# Patient Record
Sex: FEMALE | Race: WHITE | Employment: UNEMPLOYED | URBAN - METROPOLITAN AREA
[De-identification: names, ages, dates, MRNs, and addresses within clinical notes are randomized per-mention and may not be internally consistent; named-entity substitution may affect disease eponyms.]

---

## 2017-07-17 ENCOUNTER — HOSPITAL ENCOUNTER (OUTPATIENT)
Dept: OTHER | Age: 62
Discharge: HOME | End: 2017-07-31
Attending: INTERNAL MEDICINE | Admitting: INTERNAL MEDICINE

## 2019-07-30 ENCOUNTER — HOSPITAL ENCOUNTER (OUTPATIENT)
Dept: WOUND CARE | Age: 64
Discharge: HOME | End: 2019-07-30
Payer: MEDICARE

## 2019-07-30 VITALS
TEMPERATURE: 97.4 F | RESPIRATION RATE: 18 BRPM | HEIGHT: 64 IN | BODY MASS INDEX: 31.5 KG/M2 | SYSTOLIC BLOOD PRESSURE: 113 MMHG | WEIGHT: 184.53 LBS | HEART RATE: 86 BPM | DIASTOLIC BLOOD PRESSURE: 77 MMHG

## 2019-07-30 DIAGNOSIS — L24.0 DERMATITIS DUE TO DETERGENTS: ICD-10-CM

## 2019-07-30 PROCEDURE — 99202 OFFICE O/P NEW SF 15 MIN: CPT | Performed by: NURSE PRACTITIONER

## 2019-07-30 PROCEDURE — 99213 OFFICE O/P EST LOW 20 MIN: CPT

## 2019-07-30 RX ORDER — ATORVASTATIN CALCIUM 40 MG/1
40 TABLET, FILM COATED ORAL DAILY
COMMUNITY

## 2019-07-30 SDOH — HEALTH STABILITY: MENTAL HEALTH: HOW OFTEN DO YOU HAVE A DRINK CONTAINING ALCOHOL?: NEVER

## 2019-07-30 ASSESSMENT — PAIN SCALES - GENERAL
PAINLEVEL_OUTOF10: 0
PAINLEVEL_OUTOF10: 0

## 2019-07-31 PROBLEM — L24.0 DERMATITIS DUE TO DETERGENTS: Status: ACTIVE | Noted: 2019-07-31

## 2019-07-31 NOTE — PROGRESS NOTES
washing dishes even if detergent is now one she is used to. Advised best application of Eucerin at least 2x/day and to wear cotton white gloves at night. Advised to refrain from pulling off peeling skin. Pt/family agreeable to plan of care and questions answered. Treatment Note please see attached Discharge Instructions    Written patient dismissal instructions given to patient and signed by patient or POA. Discharge Instructions       [address] and [facility] Therapy   Physician Orders and Discharge Instructions  [address] [facility]  [address] [facility], [facility]  Telephone: [phone]    NAME:  Kervin Shook  YOB: 1955  MEDICAL RECORD NUMBER:  [id number]  DATE:  7/30/2019    Wound Cleansing:   Do not scrub or use excessive force. Cleanse wound prior to applying a clean dressing with:  [x] Normal Saline [x] Keep Wound Dry in Shower           Topical Treatments:  Do not apply lotions, creams, or ointments to wound bed unless directed. [] Apply moisturizing lotion to skin surrounding the wound prior to dressing change.  [] Apply antifungal ointment to skin surrounding the wound prior to dressing change.  [] Apply thin film of moisture barrier ointment to skin immediately around wound.   [x] Other:  **EUCERIN  CREAM TO BOTH HANDS 3-4 TIMES DAILY    ** NO DISHWASHING UNTIL HANDS ARE HEALED**       Dressings:           Wound Location  BILATERAL HANDS      [] Apply Primary Dressing:       [] MediHoney Gel [] Alginate with Silver [] Alginate   [] Collagen [] Collagen with Silver   [] Santyl with Moisten saline gauze     [] Hydrocolloid   [] MediHoney Alginate [] Foam with Silver   [] Foam   [] Hydrofera Blue    [] Mepilex Border    [] Moisten with Saline [] Hydrogel [] Mepitel     [] Bactroban/Mupirocin [] Polysporin  [] Other:      [] Cover and Secure with:     [] Gauze [] Tanner [] CNP                       Electronically signed by RANDA Angela CNP on 7/31/2019 at 3:16 PM

## 2019-08-12 ENCOUNTER — HOSPITAL ENCOUNTER (OUTPATIENT)
Dept: WOUND CARE | Age: 64
Discharge: HOME | End: 2019-08-12
Payer: MEDICARE

## 2019-08-12 VITALS
SYSTOLIC BLOOD PRESSURE: 131 MMHG | HEART RATE: 101 BPM | TEMPERATURE: 97.7 F | DIASTOLIC BLOOD PRESSURE: 89 MMHG | RESPIRATION RATE: 20 BRPM

## 2019-08-12 DIAGNOSIS — L24.0 DERMATITIS DUE TO DETERGENTS: Primary | ICD-10-CM

## 2019-08-12 DIAGNOSIS — S61.401D OPEN WOUND OF RIGHT HAND WITHOUT FOREIGN BODY, UNSPECIFIED WOUND TYPE, SUBSEQUENT ENCOUNTER: ICD-10-CM

## 2019-08-12 PROCEDURE — 11042 DBRDMT SUBQ TIS 1ST 20SQCM/<: CPT

## 2019-08-12 PROCEDURE — 11042 DBRDMT SUBQ TIS 1ST 20SQCM/<: CPT | Performed by: NURSE PRACTITIONER

## 2019-08-12 RX ORDER — CLOBETASOL PROPIONATE 0.5 MG/G
OINTMENT TOPICAL ONCE
Status: CANCELLED | OUTPATIENT
Start: 2019-08-12

## 2019-08-12 RX ORDER — GENTAMICIN SULFATE 1 MG/G
OINTMENT TOPICAL ONCE
Status: CANCELLED | OUTPATIENT
Start: 2019-08-12

## 2019-08-12 RX ORDER — BACITRACIN, NEOMYCIN, POLYMYXIN B 400; 3.5; 5 [USP'U]/G; MG/G; [USP'U]/G
OINTMENT TOPICAL ONCE
Status: CANCELLED | OUTPATIENT
Start: 2019-08-12

## 2019-08-12 RX ORDER — BETAMETHASONE DIPROPIONATE 0.05 %
OINTMENT (GRAM) TOPICAL ONCE
Status: CANCELLED | OUTPATIENT
Start: 2019-08-12

## 2019-08-12 RX ORDER — GINSENG 100 MG
CAPSULE ORAL ONCE
Status: CANCELLED | OUTPATIENT
Start: 2019-08-12

## 2019-08-12 RX ORDER — BACITRACIN ZINC AND POLYMYXIN B SULFATE 500; 1000 [USP'U]/G; [USP'U]/G
OINTMENT TOPICAL ONCE
Status: CANCELLED | OUTPATIENT
Start: 2019-08-12

## 2019-08-12 ASSESSMENT — PAIN DESCRIPTION - LOCATION
LOCATION: HAND
LOCATION: HAND

## 2019-08-12 ASSESSMENT — PAIN SCALES - GENERAL
PAINLEVEL_OUTOF10: 9
PAINLEVEL_OUTOF10: 10

## 2019-08-12 ASSESSMENT — PAIN DESCRIPTION - ONSET
ONSET: ON-GOING
ONSET: ON-GOING

## 2019-08-12 ASSESSMENT — PAIN DESCRIPTION - PAIN TYPE
TYPE: CHRONIC PAIN
TYPE: CHRONIC PAIN

## 2019-08-12 ASSESSMENT — PAIN - FUNCTIONAL ASSESSMENT
PAIN_FUNCTIONAL_ASSESSMENT: PREVENTS OR INTERFERES SOME ACTIVE ACTIVITIES AND ADLS
PAIN_FUNCTIONAL_ASSESSMENT: PREVENTS OR INTERFERES SOME ACTIVE ACTIVITIES AND ADLS

## 2019-08-12 ASSESSMENT — PAIN DESCRIPTION - ORIENTATION
ORIENTATION: RIGHT
ORIENTATION: RIGHT

## 2019-08-12 ASSESSMENT — PAIN DESCRIPTION - PROGRESSION
CLINICAL_PROGRESSION: NOT CHANGED
CLINICAL_PROGRESSION: NOT CHANGED

## 2019-08-12 ASSESSMENT — PAIN DESCRIPTION - FREQUENCY
FREQUENCY: INTERMITTENT
FREQUENCY: INTERMITTENT

## 2019-08-12 ASSESSMENT — PAIN DESCRIPTION - DESCRIPTORS
DESCRIPTORS: STABBING
DESCRIPTORS: STABBING

## 2019-08-13 PROBLEM — S61.401A OPEN WOUND OF RIGHT HAND WITHOUT FOREIGN BODY: Status: ACTIVE | Noted: 2019-08-13

## 2019-08-13 NOTE — PROGRESS NOTES
Undefined edges 8/12/2019 10:22 AM   Kimberlyn-wound Assessment Dry;Pink 8/12/2019 10:22 AM   Non-staged Wound Description Partial thickness 8/12/2019 10:22 AM   [place]%Wound Bed 50 8/12/2019 10:22 AM   Yellow%Wound Bed 50 8/12/2019 10:22 AM   Number of days: 14       Wound 07/30/19 Hand Left Wound #2; noted approx 6/25/19 (Active)   Wound Image   8/12/2019 10:22 AM   Wound Other 7/30/2019 12:09 PM   Dressing Status Other (Comment) 7/30/2019 11:43 AM   Wound Length (cm) 0 cm 8/12/2019 10:22 AM   Wound Width (cm) 0 cm 8/12/2019 10:22 AM   Wound Depth (cm) 0 cm 8/12/2019 10:22 AM   Wound Surface Area (cm^2) 0 cm^2 8/12/2019 10:22 AM   Change in Wound Size % (l*w) 100 8/12/2019 10:22 AM   Wound Volume (cm^3) 0 cm^3 8/12/2019 10:22 AM   Wound Healing % 100 8/12/2019 10:22 AM   Post-Procedure Length (cm) 0 cm 8/12/2019 11:00 AM   Post-Procedure Width (cm) 0 cm 8/12/2019 11:00 AM   Post-Procedure Depth (cm) 0 cm 8/12/2019 11:00 AM   Post-Procedure Surface Area (cm^2) 0 cm^2 8/12/2019 11:00 AM   Post-Procedure Volume (cm^3) 0 cm^3 8/12/2019 11:00 AM   Wound Assessment Epithelialization 8/12/2019 11:00 AM   Drainage Amount None 7/30/2019 11:43 AM   Odor None 7/30/2019 11:43 AM   Margins Undefined edges 7/30/2019 11:43 AM   Kimberlyn-wound Assessment Fragile;Pink 7/30/2019 11:43 AM   Non-staged Wound Description Partial thickness 7/30/2019 11:43 AM   [place]%Wound Bed 80 7/30/2019 11:43 AM   Yellow%Wound Bed 20 7/30/2019 11:43 AM   Number of days: 14       Wound 08/12/19 Leg Anterior; Lower;Right #3 ( first noted 8/12/2019 ) (Active)   Wound Image   8/12/2019 11:14 AM   Wound Traumatic 8/12/2019 11:14 AM   Dressing Changed Changed/New 8/12/2019 11:24 AM   Wound Length (cm) 0.8 cm 8/12/2019 11:14 AM   Wound Width (cm) 0.5 cm 8/12/2019 11:14 AM   Wound Depth (cm) 0.2 cm 8/12/2019 11:14 AM   Wound Surface Area (cm^2) 0.4 cm^2 8/12/2019 11:14 AM   Wound Volume (cm^3) 0.08 cm^3 8/12/2019 11:14 AM   Post-Procedure Length (cm) 0.8 cm 8/12/2019

## 2019-08-14 RX ORDER — LIDOCAINE HYDROCHLORIDE 40 MG/ML
SOLUTION TOPICAL ONCE
Status: DISCONTINUED | OUTPATIENT
Start: 2019-08-14 | End: 2019-08-19

## 2019-08-19 ENCOUNTER — HOSPITAL ENCOUNTER (OUTPATIENT)
Dept: WOUND CARE | Age: 64
Discharge: HOME | End: 2019-08-19
Payer: MEDICARE

## 2019-08-19 VITALS
DIASTOLIC BLOOD PRESSURE: 85 MMHG | SYSTOLIC BLOOD PRESSURE: 126 MMHG | RESPIRATION RATE: 18 BRPM | TEMPERATURE: 98.5 F | HEART RATE: 101 BPM

## 2019-08-19 DIAGNOSIS — L24.0 DERMATITIS DUE TO DETERGENTS: ICD-10-CM

## 2019-08-19 DIAGNOSIS — S61.401D OPEN WOUND OF RIGHT HAND WITHOUT FOREIGN BODY, UNSPECIFIED WOUND TYPE, SUBSEQUENT ENCOUNTER: Primary | ICD-10-CM

## 2019-08-19 PROCEDURE — 11042 DBRDMT SUBQ TIS 1ST 20SQCM/<: CPT | Performed by: NURSE PRACTITIONER

## 2019-08-19 PROCEDURE — 11042 DBRDMT SUBQ TIS 1ST 20SQCM/<: CPT

## 2019-08-19 RX ORDER — LIDOCAINE HYDROCHLORIDE 40 MG/ML
SOLUTION TOPICAL ONCE
Status: DISCONTINUED | OUTPATIENT
Start: 2019-08-19 | End: 2019-08-20 | Stop reason: HOSPADM

## 2019-08-19 ASSESSMENT — PAIN SCALES - GENERAL: PAINLEVEL_OUTOF10: 0

## 2019-08-20 NOTE — PROGRESS NOTES
dressing change.  [] Apply antifungal ointment to skin surrounding the wound prior to dressing change.  [] Apply thin film of moisture barrier ointment to skin immediately around wound. [] Other:            Dressings:                  Wound Location  : RIGHT LATERAL ANKLE AND RIGHT ANTERIOR LOWER LEG     [x] Apply Primary Dressing:                                              [x] Other:  Mupirocin Ointment     [x] Cover and Secure with:                       [] Gauze [] Tanner   [] Kerlix              [] Ace Wrap       [] Cover Roll Tape         [] ABD                              [x] Other: Bandaid                 Avoid contact of tape with skin.     [x] Change dressing:       [x] Daily               [] Every Other Day        [] Three times per week              [] Once a week  [] Do Not Change Dressing         [] Other:      Dietary:  [x] Diet as tolerated:        [] Calorie Diabetic Diet: [] No Added Salt:  [] Increase Protein:        [] Other:      Activity:  [x] Activity as tolerated:  [] Patient has no activity restrictions     [] Strict Bedrest:            [] Remain off Work:     [] May return to full duty work: [phone] to work with [address]     If you are still having pain after you go home:  [x] Elevate the affected limb.        [x] Use over-the-counter medications you would normally use for pain as permitted by your family doctor.   [x] For persistent pain not relieved by the above interventions, please call your family doctor.             Return Appointment:  [] Wound and dressing supply provider:   [] ECF or Home Healthcare:   [] Wound Natan Jolly or NP scheduled for Wound Assessment:   [x] Return Appointment: Lacie Escobar CNP IN 2 WEEK  (September 3rd)  [] Ordered tests:      Nurse Case Manger: [facility]  Electronically signed by Jael Valle RN on 8/19/2019 at 10:39 AM         [address] [facility], [place] [address] Information: Should you experience any

## 2019-09-03 ENCOUNTER — HOSPITAL ENCOUNTER (OUTPATIENT)
Dept: WOUND CARE | Age: 64
Discharge: HOME | End: 2019-09-03
Payer: MEDICARE

## 2019-09-03 ENCOUNTER — TELEPHONE (OUTPATIENT)
Dept: WOUND CARE | Age: 64
End: 2019-09-03

## 2019-09-05 NOTE — DISCHARGE INSTR - COC
(cm) 0 cm 8/19/2019 10:13 AM   Wound Width (cm) 0 cm 8/19/2019 10:13 AM   Wound Depth (cm) 0 cm 8/19/2019 10:13 AM   Wound Surface Area (cm^2) 0 cm^2 8/19/2019 10:13 AM   Change in Wound Size % (l*w) 100 8/19/2019 10:13 AM   Wound Volume (cm^3) 0 cm^3 8/19/2019 10:13 AM   Wound Healing % 100 8/19/2019 10:13 AM   Post-Procedure Length (cm) 0 cm 8/19/2019 10:32 AM   Post-Procedure Width (cm) 0 cm 8/19/2019 10:32 AM   Post-Procedure Depth (cm) 0 cm 8/19/2019 10:32 AM   Post-Procedure Surface Area (cm^2) 0 cm^2 8/19/2019 10:32 AM   Post-Procedure Volume (cm^3) 0 cm^3 8/19/2019 10:32 AM   Wound Assessment Epithelialization 8/19/2019 10:13 AM   Margins Undefined edges 8/12/2019 10:22 AM   Kimberlyn-wound Assessment Dry;Pink 8/12/2019 10:22 AM   Non-staged Wound Description Partial thickness 8/12/2019 10:22 AM   [place]%Wound Bed 50 8/12/2019 10:22 AM   Yellow%Wound Bed 50 8/12/2019 10:22 AM   Number of days: 37       Wound 08/12/19 Leg Anterior; Lower;Right #3 ( first noted 8/12/2019 ) (Active)   Wound Image   8/12/2019 11:14 AM   Wound Traumatic 8/12/2019 11:14 AM   Dressing Status Other (Comment) 8/19/2019 10:13 AM   Dressing Changed Changed/New 8/19/2019 10:55 AM   Dressing/Treatment Antibacterial Ointment; Adhesive bandage 8/19/2019 10:55 AM   Wound Length (cm) 0.8 cm 8/19/2019 10:13 AM   Wound Width (cm) 0.5 cm 8/19/2019 10:13 AM   Wound Depth (cm) 0.2 cm 8/19/2019 10:13 AM   Wound Surface Area (cm^2) 0.4 cm^2 8/19/2019 10:13 AM   Change in Wound Size % (l*w) 0 8/19/2019 10:13 AM   Wound Volume (cm^3) 0.08 cm^3 8/19/2019 10:13 AM   Wound Healing % 0 8/19/2019 10:13 AM   Post-Procedure Length (cm) 0.9 cm 8/19/2019 10:32 AM   Post-Procedure Width (cm) 0.6 cm 8/19/2019 10:32 AM   Post-Procedure Depth (cm) 0.2 cm 8/19/2019 10:32 AM   Post-Procedure Surface Area (cm^2) 0.54 cm^2 8/19/2019 10:32 AM   Post-Procedure Volume (cm^3) 0.11 cm^3 8/19/2019 10:32 AM   Wound Assessment Granulation tissue;Slough 8/19/2019 10:13 AM   Drainage {Admit to Appropriate Level of Care:78095} for {GREATER/LESS:330642771} 30 days.      Update Admission H&P: {CHP DME Changes in CQLTW:781767615}    PHYSICIAN SIGNATURE:  {Esignature:907877686}

## 2019-09-09 ENCOUNTER — HOSPITAL ENCOUNTER (OUTPATIENT)
Dept: WOUND CARE | Age: 64
Discharge: HOME | End: 2019-09-09
Payer: MEDICARE

## 2019-09-09 VITALS
HEART RATE: 121 BPM | BODY MASS INDEX: 29.29 KG/M2 | WEIGHT: 170.64 LBS | TEMPERATURE: 97.8 F | DIASTOLIC BLOOD PRESSURE: 85 MMHG | SYSTOLIC BLOOD PRESSURE: 121 MMHG | RESPIRATION RATE: 20 BRPM

## 2019-09-09 DIAGNOSIS — L97.919 VENOUS STASIS ULCER OF RIGHT LOWER EXTREMITY (HCC): ICD-10-CM

## 2019-09-09 DIAGNOSIS — I83.019 VENOUS STASIS ULCER OF RIGHT LOWER EXTREMITY (HCC): ICD-10-CM

## 2019-09-09 DIAGNOSIS — I87.2 VENOUS INSUFFICIENCY OF BOTH LOWER EXTREMITIES: ICD-10-CM

## 2019-09-09 PROCEDURE — 11042 DBRDMT SUBQ TIS 1ST 20SQCM/<: CPT

## 2019-09-09 PROCEDURE — 11042 DBRDMT SUBQ TIS 1ST 20SQCM/<: CPT | Performed by: NURSE PRACTITIONER

## 2019-09-09 RX ORDER — LIDOCAINE HYDROCHLORIDE 40 MG/ML
SOLUTION TOPICAL ONCE
Status: DISCONTINUED | OUTPATIENT
Start: 2019-09-09 | End: 2019-09-10 | Stop reason: HOSPADM

## 2019-09-09 ASSESSMENT — PAIN SCALES - GENERAL
PAINLEVEL_OUTOF10: 0
PAINLEVEL_OUTOF10: 0

## 2019-09-11 PROBLEM — L97.919 VENOUS STASIS ULCER OF RIGHT LOWER EXTREMITY (HCC): Status: ACTIVE | Noted: 2019-09-11

## 2019-09-11 PROBLEM — I83.019 VENOUS STASIS ULCER OF RIGHT LOWER EXTREMITY (HCC): Status: ACTIVE | Noted: 2019-09-11

## 2019-09-11 PROBLEM — I87.2 VENOUS INSUFFICIENCY OF BOTH LOWER EXTREMITIES: Status: ACTIVE | Noted: 2019-09-11

## 2019-09-11 NOTE — PROGRESS NOTES
[facility]   Progress Note and Procedure Note      Jessica Young  MEDICAL RECORD NUMBER:  [id number]  AGE: 62 y.o. GENDER: female  : 1955  EPISODE DATE:  2019    Subjective:     Chief Complaint   Patient presents with    Wound Check     follow up right lower leg and feet wounds         HISTORY of PRESENT ILLNESS HPI  Meagan Simpson is a 62 y. o. female who presents today for wound/ulcer evaluation. Still with partial thickness wounds right lower leg and right lateral malleolus. Leg wounds come from trauma \"banging into things\". Pt is brought in by a friend who is familiar with pt's history. Denies other constitutional issues. When asked if pt was on any psychiatric or antidepressant medications her friend said no.    History of Wound Context: allergic skin exposure and traumatic wounds lower legs  Wound/Ulcer Pain Timing/Severity: intermittenent  Quality of pain: N/A  Severity:  3 / 10   Modifying Factors: None  Associated Signs/Symptoms:      Ulcer Identification:  Ulcer Type: partial thickness wounds, venous stasis     Contributing Factors: pt has multiple new and healing open areas from \"bumping\" into things. She sits \"cross legged\" \"most of the time\" rubbing right lateral malleolus.     Wound: N/A      PAST MEDICAL HISTORY        Diagnosis Date    Arthritis     hip and shoulder    Asthma     Hiatal hernia     Hyperlipidemia     Hypothyroid     UTI (urinary tract infection)        PAST SURGICAL HISTORY    Past Surgical History:   Procedure Laterality Date    HERNIA REPAIR         FAMILY HISTORY    History reviewed. No pertinent family history.     SOCIAL HISTORY    Social History     Tobacco Use    Smoking status: Former Smoker     Last attempt to quit: 2018     Years since quittin.7    Smokeless tobacco: Never Used   Substance Use Topics    Alcohol use: Not Currently     Frequency: Never     Comment: Patient Quit Drinking 33 years ago    Drug use: Not on file       ALLERGIES    Allergies   Allergen Reactions    Tramadol Anaphylaxis       MEDICATIONS    Current Outpatient Medications on File Prior to Encounter   Medication Sig Dispense Refill    NONFORMULARY Indications: Minerin cream to hands       Acetaminophen (TYLENOL PO) Take 500 mg by mouth      aspirin 81 MG tablet Take 81 mg by mouth daily      Levothyroxine Sodium (SYNTHROID PO) Take by mouth      Fluticasone-Salmeterol (ADVAIR DISKUS IN) Inhale into the lungs 2 times daily      atorvastatin (LIPITOR) 40 MG tablet Take 40 mg by mouth daily       No current facility-administered medications on file prior to encounter. REVIEW OF SYSTEMS    Pertinent items are noted in HPI. Objective:      /85   Pulse 121   Temp 97.8 °F (36.6 °C) (Oral)   Resp 20   Wt 170 lb 10.2 oz (77.4 kg)   BMI 29.29 kg/m²     Wt Readings from Last 3 Encounters:   09/09/19 170 lb 10.2 oz (77.4 kg)   07/30/19 184 lb 8.4 oz (83.7 kg)       PHYSICAL EXAM    General Appearance: alert and oriented to person, place and time, well-developed and well-nourished, in no acute distress, disheveled and pale  Skin: warm and dry  Head: normocephalic and atraumatic  Eyes: pupils equal, round, and reactive to light  Pulmonary/Chest:  normal air movement, no respiratory distress  Cardiovascular: normal rate, regular rhythm and intact distal pulses  Extremities: no cyanosis and no clubbing  Wounds:  Improvement in measurements and no overt signs of infection    Assessment:        Problem List Items Addressed This Visit     Venous insufficiency of both lower extremities    Venous stasis ulcer of right lower extremity ([address]           Procedure Note  Indications:  Based on my examination of this patient's wound(s)/ulcer(s) today, debridement is required to promote healing and evaluate the wound base.     Performed by: RANDA Mcdermott - JACK    Consent obtained:  Yes    Time out taken:  Yes    Pain Control: Anesthetic  Anesthetic: 4% noted 8/12/2019 ) (Active)   Wound Image   9/9/2019  1:42 PM   Wound Traumatic 8/12/2019 11:14 AM   Dressing Status Other (Comment) 8/19/2019 10:13 AM   Dressing Changed Changed/New 9/9/2019  2:44 PM   Dressing/Treatment Adhesive bandage; Antibacterial Ointment 9/9/2019  2:44 PM   Wound Length (cm) 0.4 cm 9/9/2019  1:42 PM   Wound Width (cm) 0.3 cm 9/9/2019  1:42 PM   Wound Depth (cm) 0.1 cm 9/9/2019  1:42 PM   Wound Surface Area (cm^2) 0.12 cm^2 9/9/2019  1:42 PM   Change in Wound Size % (l*w) 40 9/9/2019  1:42 PM   Wound Volume (cm^3) 0.01 cm^3 9/9/2019  1:42 PM   Wound Healing % 75 9/9/2019  1:42 PM   Post-Procedure Length (cm) 0.4 cm 9/9/2019  2:44 PM   Post-Procedure Width (cm) 0.3 cm 9/9/2019  2:44 PM   Post-Procedure Depth (cm) 0.1 cm 9/9/2019  2:44 PM   Post-Procedure Surface Area (cm^2) 0.12 cm^2 9/9/2019  2:44 PM   Post-Procedure Volume (cm^3) 0.01 cm^3 9/9/2019  2:44 PM   Wound Assessment Granulation tissue;Slough 9/9/2019  1:42 PM   Drainage Amount Other (Comment) 9/9/2019  1:42 PM   Odor None 9/9/2019  1:42 PM   Margins Attached edges 9/9/2019  1:42 PM   Kimberlyn-wound Assessment White 9/9/2019  1:42 PM   Non-staged Wound Description Full thickness 9/9/2019  1:42 PM   Red%Wound Bed 80 8/19/2019 10:13 AM   Yellow%Wound Bed 100 9/9/2019  1:42 PM   Number of days: 29       Wound 09/09/19 Foot Right;Medial # 1 right medial foot onset 5 days ago (Active)   Wound Image   9/9/2019  1:42 PM   Dressing Changed Changed/New 9/9/2019  2:44 PM   Dressing/Treatment Antibacterial Ointment; Adhesive bandage 9/9/2019  2:44 PM   Wound Length (cm) 0.4 cm 9/9/2019  1:42 PM   Wound Width (cm) 0.4 cm 9/9/2019  1:42 PM   Wound Depth (cm) 0.1 cm 9/9/2019  1:42 PM   Wound Surface Area (cm^2) 0.16 cm^2 9/9/2019  1:42 PM   Wound Volume (cm^3) 0.02 cm^3 9/9/2019  1:42 PM   Post-Procedure Length (cm) 0.4 cm 9/9/2019  2:44 PM   Post-Procedure Width (cm) 0.4 cm 9/9/2019  2:44 PM   Post-Procedure Depth (cm) 0.1 cm 9/9/2019  2:44 PM your wound care, please contact the [address] [place] [address] [phone] [address] 8:00 am - 4:30 pm and Friday 8:00 am - 12:30 pm.  If you need help with your wound outside these hours and cannot wait until we are again available, contact your PCP or go to the hospital emergency room.      PLEASE NOTE: IF YOU ARE UNABLE TO OBTAIN WOUND SUPPLIES, CONTINUE TO USE THE SUPPLIES YOU HAVE AVAILABLE UNTIL YOU ARE ABLE TO [facility].  IT IS MOST IMPORTANT TO KEEP THE WOUND COVERED AT ALL TIMES.     Physician Signature:_______________________     Date: ___________ Time:  ____________        Nish Moran CNP           Electronically signed by RANDA Boothe CNP on 9/11/2019 at 10:27 AM

## 2019-09-23 ENCOUNTER — HOSPITAL ENCOUNTER (OUTPATIENT)
Dept: WOUND CARE | Age: 64
Discharge: HOME | End: 2019-09-23
Payer: MEDICARE

## 2019-09-23 VITALS
RESPIRATION RATE: 20 BRPM | HEART RATE: 97 BPM | SYSTOLIC BLOOD PRESSURE: 114 MMHG | DIASTOLIC BLOOD PRESSURE: 77 MMHG | TEMPERATURE: 97.2 F

## 2019-09-23 DIAGNOSIS — I87.2 VENOUS INSUFFICIENCY OF BOTH LOWER EXTREMITIES: ICD-10-CM

## 2019-09-23 DIAGNOSIS — L24.0 DERMATITIS DUE TO DETERGENTS: ICD-10-CM

## 2019-09-23 DIAGNOSIS — L97.919 VENOUS STASIS ULCER OF RIGHT LOWER EXTREMITY (HCC): ICD-10-CM

## 2019-09-23 DIAGNOSIS — I83.019 VENOUS STASIS ULCER OF RIGHT LOWER EXTREMITY (HCC): ICD-10-CM

## 2019-09-23 PROCEDURE — 97597 DBRDMT OPN WND 1ST 20 CM/<: CPT | Performed by: NURSE PRACTITIONER

## 2019-09-23 PROCEDURE — 97597 DBRDMT OPN WND 1ST 20 CM/<: CPT

## 2019-09-23 RX ORDER — LIDOCAINE 50 MG/G
OINTMENT TOPICAL PRN
Status: DISCONTINUED | OUTPATIENT
Start: 2019-09-23 | End: 2019-09-24 | Stop reason: HOSPADM

## 2019-09-23 ASSESSMENT — PAIN SCALES - GENERAL
PAINLEVEL_OUTOF10: 0
PAINLEVEL_OUTOF10: 0

## 2019-09-24 NOTE — PROGRESS NOTES
[facility]   Progress Note and Procedure Note      Quincy Faustin  MEDICAL RECORD NUMBER:  [id number]  AGE: 62 y.o. GENDER: female  : 1955  EPISODE DATE:  2019    Subjective:     Chief Complaint   Patient presents with    Wound Check     Follow-up visit for wounds to the right medial foot, right anterior lower leg, and right lateral ankle. HISTORY of PRESENT ILLNESS HPI  Chauncey Siemens Perkins is a 62 y. o. female who presents today for wound/ulcer evaluation. Still with partial thickness wound right lateral malleolus. Pt states her ankle is chronically swollen and sometimes \"more than others\". Pt sits cross-legged for comfort most of the time with lateral ankle pressure/shear. Leg wounds come from trauma \"banging into things\". Pt is brought in by a friend who is familiar with pt's history, but not related. Denies other constitutional issues. When asked if pt was on any psychiatric or antidepressant medications her friend said no.    History of Wound Context: allergic skin exposure and traumatic wounds lower legs  Wound/Ulcer Pain Timing/Severity: intermittenent  Quality of pain: N/A  Severity:  3 / 10   Modifying Factors: None  Associated Signs/Symptoms:      Ulcer Identification:  Ulcer Type: partial thickness wounds, venous stasis     Contributing Factors: pt has multiple new and healing open areas from \"bumping\" into things.  She sits \"cross legged\" \"most of the time\" rubbing right lateral malleolus.     Wound: N/A                               PAST MEDICAL HISTORY        Diagnosis Date    Arthritis     hip and shoulder    Asthma     Hiatal hernia     Hyperlipidemia     Hypothyroid     UTI (urinary tract infection)     Venous stasis ulcer of right lower extremity ([facility] [address] 2019       PAST SURGICAL HISTORY    Past Surgical History:   Procedure Laterality Date    HERNIA REPAIR         FAMILY HISTORY    History reviewed. No pertinent family history.     SOCIAL

## 2019-09-30 ENCOUNTER — HOSPITAL ENCOUNTER (OUTPATIENT)
Dept: WOUND CARE | Age: 64
Discharge: HOME | End: 2019-09-30
Payer: MEDICARE

## 2019-11-01 ENCOUNTER — TELEPHONE (OUTPATIENT)
Dept: WOUND CARE | Age: 64
End: 2019-11-01

## 2019-11-11 ENCOUNTER — TELEPHONE (OUTPATIENT)
Dept: FAMILY MEDICINE CLINIC | Age: 64
End: 2019-11-11

## 2020-03-31 ENCOUNTER — TELEPHONE (OUTPATIENT)
Dept: FAMILY MEDICINE CLINIC | Age: 65
End: 2020-03-31

## 2021-01-08 ENCOUNTER — OFFICE VISIT (OUTPATIENT)
Dept: PRIMARY CARE CLINIC | Age: 66
End: 2021-01-08
Payer: MEDICARE

## 2021-01-08 DIAGNOSIS — Z01.812 PRE-PROCEDURAL LABORATORY EXAMINATIONS: Primary | ICD-10-CM

## 2021-01-08 PROCEDURE — 99211 OFF/OP EST MAY X REQ PHY/QHP: CPT | Performed by: NURSE PRACTITIONER

## 2021-01-08 PROCEDURE — G8421 BMI NOT CALCULATED: HCPCS | Performed by: NURSE PRACTITIONER

## 2021-01-08 PROCEDURE — G8428 CUR MEDS NOT DOCUMENT: HCPCS | Performed by: NURSE PRACTITIONER

## 2021-01-08 NOTE — PROGRESS NOTES
Patient presented to [facility] drive up clinic for preop testing. Patient was swabbed and given information advising them to remain isolated until procedure date.

## 2021-01-09 LAB — SARS-COV-2: NOT DETECTED

## 2021-01-18 ENCOUNTER — OFFICE VISIT (OUTPATIENT)
Dept: PRIMARY CARE CLINIC | Age: 66
End: 2021-01-18
Payer: MEDICARE

## 2021-01-18 DIAGNOSIS — Z01.812 PRE-PROCEDURAL LABORATORY EXAMINATIONS: Primary | ICD-10-CM

## 2021-01-18 LAB — SARS-COV-2: NOT DETECTED

## 2021-01-18 PROCEDURE — G8421 BMI NOT CALCULATED: HCPCS | Performed by: NURSE PRACTITIONER

## 2021-01-18 PROCEDURE — G8428 CUR MEDS NOT DOCUMENT: HCPCS | Performed by: NURSE PRACTITIONER

## 2021-01-18 PROCEDURE — 99211 OFF/OP EST MAY X REQ PHY/QHP: CPT | Performed by: NURSE PRACTITIONER

## 2021-07-02 ENCOUNTER — TELEPHONE (OUTPATIENT)
Dept: FAMILY MEDICINE CLINIC | Age: 66
End: 2021-07-02

## 2021-07-02 NOTE — TELEPHONE ENCOUNTER
----- Message from Bebeto Rodriguez sent at 7/2/2021  9:43 AM EDT -----  Subject: Refill Request    QUESTIONS  Name of Medication? Other - buspirone  Patient-reported dosage and instructions? take 1 tablet twice   How many days do you have left? 2  Preferred Pharmacy? [id number]  Pharmacy phone number (if available)? [phone]  ---------------------------------------------------------------------------  --------------  [employer] INFO  What is the best way for the office to contact you? OK to leave message on   voicemail  Preferred Call Back Phone Number?  [phone]

## 2022-02-02 ENCOUNTER — TELEPHONE (OUTPATIENT)
Dept: FAMILY MEDICINE CLINIC | Age: 67
End: 2022-02-02

## 2022-02-02 RX ORDER — METHYLPREDNISOLONE 4 MG/1
TABLET ORAL
Qty: 1 KIT | Refills: 0 | Status: SHIPPED | OUTPATIENT
Start: 2022-02-02 | End: 2022-02-08

## 2022-02-02 NOTE — TELEPHONE ENCOUNTER
Sent prescription(s) as directed to requested pharmacy. Unable to reach patient, wrong contact number.

## 2022-02-02 NOTE — TELEPHONE ENCOUNTER
Pt has arthritis in her back and she has had back pain now for 3 days and would like to know if Dr. Abdullahi Cummins would prescribe her some steroids. Pt has been taking pain pills and using a heating pad and it is not helping her.      Loren Villar     Pl advise   [phone]

## 2022-02-07 ENCOUNTER — TELEPHONE (OUTPATIENT)
Dept: FAMILY MEDICINE CLINIC | Age: 67
End: 2022-02-07

## 2022-02-07 NOTE — TELEPHONE ENCOUNTER
Pt stated that she called the pain management and they have to review pt's records and then once is done she wont be able to get in for about 2 months. Pt is still in pain.      Pt also stated that her new pharmacy is   [address] [facility]

## 2022-04-08 ENCOUNTER — TELEPHONE (OUTPATIENT)
Dept: FAMILY MEDICINE CLINIC | Age: 67
End: 2022-04-08

## 2022-04-08 RX ORDER — AMOXICILLIN 500 MG/1
500 CAPSULE ORAL 3 TIMES DAILY
Qty: 30 CAPSULE | Refills: 0 | Status: SHIPPED | OUTPATIENT
Start: 2022-04-08 | End: 2022-04-18

## 2022-04-08 NOTE — TELEPHONE ENCOUNTER
----- Message from [address] sent at 4/8/2022 12:57 PM EDT -----  Subject: Message to Provider    QUESTIONS  Information for Provider? Pt. Carlitos Elliott is requesting a call back   from nurse of Rajani Montanez; Cold like symptoms, she would like to have   something called in; did not want to make an appt.  ---------------------------------------------------------------------------  --------------  CALL BACK INFO  What is the best way for the office to contact you? OK to leave message on   voicemail  Preferred Call Back Phone Number? [phone]  ---------------------------------------------------------------------------  --------------  SCRIPT ANSWERS  Relationship to Patient?  Self

## 2022-04-29 ENCOUNTER — TELEPHONE (OUTPATIENT)
Dept: FAMILY MEDICINE CLINIC | Age: 67
End: 2022-04-29

## 2022-04-29 NOTE — TELEPHONE ENCOUNTER
Pt has swelling in her feet and would like to know if she could be prescribed some water pills.      Pl advise [phone] (home)      [facility] in [place]

## 2022-07-06 ENCOUNTER — TELEPHONE (OUTPATIENT)
Dept: FAMILY MEDICINE CLINIC | Age: 67
End: 2022-07-06

## 2022-07-06 NOTE — TELEPHONE ENCOUNTER
----- Message from Johana Saavedra sent at 7/6/2022  1:21 PM EDT -----  Subject: Message to Provider    QUESTIONS  Information for Provider? Roseanne Moeller would like to speak with someone in your   office. She believes she has a bad kidney infection. Note? I asked her if   I could ask questions about her health in order to schedule an appt. She   preferred sending a message.   ---------------------------------------------------------------------------  --------------  Serena SMITH  [phone]; OK to leave message on voicemail  ---------------------------------------------------------------------------  --------------  SCRIPT ANSWERS  undefined

## 2022-08-16 ENCOUNTER — TELEPHONE (OUTPATIENT)
Dept: FAMILY MEDICINE CLINIC | Age: 67
End: 2022-08-16

## 2022-08-16 NOTE — TELEPHONE ENCOUNTER
----- Message from Mariza Garcia sent at 8/16/2022 12:02 PM EDT -----  Subject: Referral Request    Reason for referral request? Back pain. Previously referred doctor \"did   nothing\" for patient. Just offered steroids and PT. Patient not happy and   wants another referral. Please call asap to discuss. She is requesting   pain management. Provider patient wants to be referred to(if known):     Provider Phone Number(if known): Additional Information for Provider?  Please call asa.  ---------------------------------------------------------------------------  --------------  Wang SANTOS    [phone]; OK to leave message on voicemail  ---------------------------------------------------------------------------  --------------

## 2023-04-28 ENCOUNTER — TELEPHONE (OUTPATIENT)
Dept: FAMILY MEDICINE CLINIC | Age: 68
End: 2023-04-28